# Patient Record
Sex: FEMALE | Race: WHITE | HISPANIC OR LATINO | URBAN - METROPOLITAN AREA
[De-identification: names, ages, dates, MRNs, and addresses within clinical notes are randomized per-mention and may not be internally consistent; named-entity substitution may affect disease eponyms.]

---

## 2020-11-15 ENCOUNTER — EMERGENCY (EMERGENCY)
Facility: HOSPITAL | Age: 75
LOS: 0 days | Discharge: HOME | End: 2020-11-15
Attending: EMERGENCY MEDICINE
Payer: MEDICARE

## 2020-11-15 VITALS — OXYGEN SATURATION: 97 % | WEIGHT: 145.06 LBS | RESPIRATION RATE: 18 BRPM | HEART RATE: 88 BPM | TEMPERATURE: 98 F

## 2020-11-15 VITALS
OXYGEN SATURATION: 98 % | DIASTOLIC BLOOD PRESSURE: 77 MMHG | HEART RATE: 77 BPM | SYSTOLIC BLOOD PRESSURE: 130 MMHG | RESPIRATION RATE: 16 BRPM | TEMPERATURE: 98 F

## 2020-11-15 PROCEDURE — 72125 CT NECK SPINE W/O DYE: CPT | Mod: 26

## 2020-11-15 PROCEDURE — 76376 3D RENDER W/INTRP POSTPROCES: CPT

## 2020-11-15 PROCEDURE — 12013 RPR F/E/E/N/L/M 2.6-5.0 CM: CPT

## 2020-11-15 PROCEDURE — 72125 CT NECK SPINE W/O DYE: CPT

## 2020-11-15 PROCEDURE — 70486 CT MAXILLOFACIAL W/O DYE: CPT

## 2020-11-15 PROCEDURE — 70450 CT HEAD/BRAIN W/O DYE: CPT | Mod: 26

## 2020-11-15 PROCEDURE — 99284 EMERGENCY DEPT VISIT MOD MDM: CPT | Mod: 25

## 2020-11-15 PROCEDURE — 70450 CT HEAD/BRAIN W/O DYE: CPT

## 2020-11-15 PROCEDURE — 76376 3D RENDER W/INTRP POSTPROCES: CPT | Mod: 26

## 2020-11-15 PROCEDURE — 70486 CT MAXILLOFACIAL W/O DYE: CPT | Mod: 26

## 2020-11-15 PROCEDURE — 99283 EMERGENCY DEPT VISIT LOW MDM: CPT

## 2020-11-15 NOTE — ED STATDOCS - PHYSICAL EXAMINATION
Constitutional: NAD AAOx3  Eyes: PERRLA EOMI  Head: +ecchymosis to L maxilla with TTP, no sign of entrapment.   ENT: No lopez sign, no raccoon eyes, no hemotympanum, no csf rhinorrhea, no nasal septal hematoma  Mouth: MMM  Cardiac: regular rate   Resp: Lungs CTAB  GI: Abd s/nt/nd  Neuro: CN2-12 intact  Skin: +2 cm linear laceration to L eyebrow.  Msk: No midline spinal ttp, full ROM of neck, c-collar cleared clinically and with provocative testing, no ttp of facial bones, no ttp to chest wall, pelvis stable, full ROM of all extremities without any ttp of extremities Constitutional: NAD AAOx3  Eyes: PERRLA EOMI  Head: +ecchymosis to L maxilla with TTP, no sign of entrapment.   ENT: No lopez sign, no raccoon eyes  Mouth: MMM  Cardiac: regular rate   Resp: Lungs CTAB  GI: Abd s/nt/nd  Neuro: CN2-12 intact  Skin: +3 cm linear laceration to L eyebrow.  Msk: No midline spinal ttp, full ROM of neck, c-collar cleared clinically and with provocative testing, no ttp of facial bones, no ttp to chest wall, pelvis stable, full ROM of all extremities without any ttp of extremities

## 2020-11-15 NOTE — ED ADULT TRIAGE NOTE - CHIEF COMPLAINT QUOTE
PT FELL AT [facility]. NO LOC, NO ANTICOAGULANTS. LACERATION ABOVE LEFT EYE. DENIES ANY VISUAL COMPLAINTS. DENIES ANY OTHER PAIN OR COMPLAINTS.

## 2020-11-15 NOTE — ED STATDOCS - CLINICAL SUMMARY MEDICAL DECISION MAKING FREE TEXT BOX
74 y/o F with PMHx of MS, ataxic gait at baseline presents to the ED from [facility] s/p fall hitting head, no LOC or AC. Pt was ambulatory after fall. Now with laceration to L eyebrow. Exam with. Will obtain CT to r/o bleed/fracture, laceration repair, and d/c 76 y/o F with PMHx of MS, ataxic gait at baseline presents to the ED from [facility] s/p fall hitting head, no LOC or AC. Pt was ambulatory after fall. Now with laceration to L eyebrow. Exam with 2 cm linear laceration to L eyebrow and ecchymosis to L maxilla with TTP. Will obtain CT to r/o bleed/fracture, laceration repair, and d/c 76 y/o F with PMHx of MS, ataxic gait at baseline presents to the ED from [facility] s/p fall hitting head, no LOC or AC. Pt was ambulatory after fall. Now with laceration to L eyebrow. Exam with 3 cm linear laceration to L eyebrow and ecchymosis to L maxilla with TTP. Will obtain CT to r/o bleed/fracture, laceration repair, and d/c

## 2020-11-15 NOTE — ED STATDOCS - NS_ ATTENDINGSCRIBEDETAILS _ED_A_ED_FT
I, Ricardo Mckeon MD,  performed the initial face to face bedside interview with this patient regarding history of present illness, review of symptoms and relevant past medical, social and family history.  I completed an independent physical examination.  I was the initial provider who evaluated this patient.  The history, relevant review of systems, past medical and surgical history, medical decision making, and physical examination was documented by the scribe in my presence and I attest to the accuracy of the documentation.

## 2020-11-15 NOTE — ED STATDOCS - OBJECTIVE STATEMENT
76 y/o F with PMHx of MS with ataxic gait at baseline presents to the ED BIBEMS from [facility] s/p trip and fall hitting her head. Notes +laceration and +ecchymosis to L eyebrow. No fever, LOC, vomiting, changes in vision, abd pain, or extremity pain. Never smoker. No ETOH use. Tetanus UTD.

## 2020-11-15 NOTE — ED ADULT NURSE NOTE - NSIMPLEMENTINTERV_GEN_ALL_ED
Implemented All Fall Risk Interventions:  [place] to call system. Call bell, personal items and telephone within reach. Instruct patient to call for assistance. Room bathroom lighting operational. Non-slip footwear when patient is off stretcher. Physically safe environment: no spills, clutter or unnecessary equipment. Stretcher in lowest position, wheels locked, appropriate side rails in place. Provide visual cue, wrist band, yellow gown, etc. Monitor gait and stability. Monitor for mental status changes and reorient to person, place, and time. Review medications for side effects contributing to fall risk. Reinforce activity limits and safety measures with patient and family.

## 2020-11-15 NOTE — ED STATDOCS - CARE PLAN
Principal Discharge DX:	Laceration of left eyebrow, initial encounter  Secondary Diagnosis:	CHI (closed head injury), initial encounter

## 2020-11-15 NOTE — ED ADULT NURSE NOTE - OBJECTIVE STATEMENT
Pt is a 75y female, awake, alert s/p fall today while ambulating, pt states her legs weakened and "gave out". Pt denies LOC, as per MD Mckeon pt is a Neuro Alert but is OK for ST. Pt placed in front of nurse station. Bed rails up, call bell within reach. Will continue to monitor.

## 2020-11-15 NOTE — ED STATDOCS - NS ED ROS FT
Constitutional: No fever or chills  Eyes: No visual changes  HEENT: No throat pain  CV: No chest pain  Resp: No SOB no cough  GI: No abd pain, nausea or vomiting  : No dysuria  MSK: No musculoskeletal pain  Skin: +laceration L eyebrow with +ecchymosis   Neuro: No headache

## 2020-11-15 NOTE — ED ADULT NURSE REASSESSMENT NOTE - NS ED NURSE REASSESS COMMENT FT1
MD Mckeon spoke with MD Ibrahim who spoke with Bakari. Pt does not need to be COVID swabbed prior to returning to facility.

## 2020-11-15 NOTE — ED STATDOCS - PATIENT PORTAL LINK FT
You can access the FollowMyHealth Patient Portal offered by [facility] by registering at the following website: [email or web address]. By joining [employer]’s FollowMyHealth portal, you will also be able to view your health information using other applications (apps) compatible with our system.

## 2020-11-15 NOTE — ED STATDOCS - NSFOLLOWUPINSTRUCTIONS_ED_ALL_ED_FT
1. return for worsening symptoms or anything concerning to you  2. take all home meds as prescribed  3. follow up with your pmd call to make an appointment    Laceration    WHAT YOU NEED TO KNOW:    A laceration is an injury to the skin and the soft tissue underneath it. Lacerations happen when you are cut or hit by something. They can happen anywhere on the body.     DISCHARGE INSTRUCTIONS:    Return to the emergency department if:     You have heavy bleeding or bleeding that does not stop after 10 minutes of holding firm, direct pressure over the wound.       Your wound opens up.     Contact your healthcare provider if:     You have a fever or chills.       Your laceration is red, warm, or swollen.      You have red streaks on your skin coming from your wound.      You have white or yellow drainage from the wound that smells bad.      You have pain that gets worse, even after treatment.       You have questions or concerns about your condition or care.     Medicines:     Prescription pain medicine may be given. Ask how to take this medicine safely.       Antibiotics help treat or prevent a bacterial infection.       Take your medicine as directed. Contact your healthcare provider if you think your medicine is not helping or if you have side effects. Tell him or her if you are allergic to any medicine. Keep a list of the medicines, vitamins, and herbs you take. Include the amounts, and when and why you take them. Bring the list or the pill bottles to follow-up visits. Carry your medicine list with you in case of an emergency.    Care for your wound as directed:     Do not get your wound wet until your healthcare provider says it is okay. Do not soak your wound in water. Do not go swimming until your healthcare provider says it is okay. Carefully wash the wound with soap and water. Gently pat the area dry or allow it to air dry.       Change your bandages when they get wet, dirty, or after washing. Apply new, clean bandages as directed. Do not apply elastic bandages or tape too tight. Do not put powders or lotions over your incision.       Apply antibiotic ointment as directed. Your healthcare provider may give you antibiotic ointment to put over your wound if you have stitches. If you have strips of tape over your incision, let them dry up and fall off on their own. If they do not fall off within 14 days, gently remove them. If you have glue over your wound, do not remove or pick at it. If your glue comes off, do not replace it with glue that you have at home.       Check your wound every day for signs of infection such as swelling, redness, or pus.     Self-care:     Apply ice on your wound for 15 to 20 minutes every hour or as directed. Use an ice pack, or put crushed ice in a plastic bag. Cover it with a towel. Ice helps prevent tissue damage and decreases swelling and pain.      Use a splint as directed. A splint will decrease movement and stress on your wound. It may help it heal faster. A splint may be used for lacerations over joints or areas of your body that bend. Ask your healthcare provider how to apply and remove a splint.       Decrease scarring of your wound by applying ointments as directed. Do not apply ointments until your healthcare provider says it is okay. You may need to wait until your wound is healed. Ask which ointment to buy and how often to use it. After your wound is healed, use sunscreen over the area when you are out in the sun. You should do this for at least 6 months to 1 year after your injury.     Follow up with your healthcare provider as directed: You may need to follow up in 24 to 48 hours to have your wound checked for infection. You will need to return in 3 to 14 days if you have stitches or staples so they can be removed. Care for your wound as directed to prevent infection and help it heal. Write down your questions so you remember to ask them during your visits.    Head Injury    WHAT YOU NEED TO KNOW:    A head injury is most often caused by a blow to the head. This may occur from a fall, bicycle injury, sports injury, being struck in the head, or a motor vehicle accident.     DISCHARGE INSTRUCTIONS:    Call 911 or have someone else call for any of the following:     You cannot be woken.      You have a seizure.      You stop responding to others or you faint.      You have blurry or double vision.      Your speech becomes slurred or confused.      You have arm or leg weakness, loss of feeling, or new problems with coordination.      Your pupils are larger than usual or one pupil is a different size than the other.       You have blood or clear fluid coming out of your ears or nose.    Return to the emergency department if:     You have repeated or forceful vomiting.      You feel confused.      Your headache gets worse or becomes severe.      You or someone caring for you notices that you are harder to wake than usual.    Contact your healthcare provider if:     Your symptoms last longer than 6 weeks after the injury.      You have questions or concerns about your condition or care.    Medicines:     Acetaminophen decreases pain. Acetaminophen is available without a doctor's order. Ask how much to take and how often to take it. Follow directions. Acetaminophen can cause liver damage if not taken correctly.      Take your medicine as directed. Contact your healthcare provider if you think your medicine is not helping or if you have side effects. Tell him or her if you are allergic to any medicine. Keep a list of the medicines, vitamins, and herbs you take. Include the amounts, and when and why you take them. Bring the list or the pill bottles to follow-up visits. Carry your medicine list with you in case of an emergency.    Self-care:     Rest or do quiet activities for 24 to 48 hours. Limit your time watching TV, using the computer, or doing tasks that require a lot of thinking. Slowly return to your normal activities as directed. Do not play sports or do activities that may cause you to get hit in the head. Ask your healthcare provider when you can return to sports.       Apply ice on your head for 15 to 20 minutes every hour or as directed. Use an ice pack, or put crushed ice in a plastic bag. Cover it with a towel before you apply it to your skin. Ice helps prevent tissue damage and decreases swelling and pain.       Have someone stay with you for 24 hours or as directed. This person can monitor you for complications and call 911. When you are awake the person should ask you a few questions to see if you are thinking clearly. An example would be to ask your name or your address.     Prevent another head injury:     Wear a helmet that fits properly. Do this when you play sports, or ride a bike, scooter, or skateboard. Helmets help decrease your risk of a serious head injury. Talk to your healthcare provider about other ways you can protect yourself if you play sports.      Wear your seat belt every time you are in a car. This helps to decrease your risk for a head injury if you are in a car accident.     Follow up with your healthcare provider as directed: Write down your questions so you remember to ask them during your visits.

## 2020-12-02 ENCOUNTER — EMERGENCY (EMERGENCY)
Facility: HOSPITAL | Age: 75
LOS: 0 days | Discharge: HOME | End: 2020-12-02
Attending: FAMILY MEDICINE
Payer: MEDICARE

## 2020-12-02 VITALS
TEMPERATURE: 98 F | DIASTOLIC BLOOD PRESSURE: 94 MMHG | RESPIRATION RATE: 20 BRPM | OXYGEN SATURATION: 98 % | SYSTOLIC BLOOD PRESSURE: 150 MMHG | HEART RATE: 80 BPM

## 2020-12-02 VITALS
TEMPERATURE: 99 F | DIASTOLIC BLOOD PRESSURE: 93 MMHG | OXYGEN SATURATION: 95 % | HEART RATE: 95 BPM | WEIGHT: 134.92 LBS | SYSTOLIC BLOOD PRESSURE: 161 MMHG | RESPIRATION RATE: 18 BRPM
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
## 2020-12-02 DIAGNOSIS — S09.90XA UNSPECIFIED INJURY OF HEAD, INITIAL ENCOUNTER: ICD-10-CM
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
## 2020-12-02 DIAGNOSIS — S01.01XA LACERATION WITHOUT FOREIGN BODY OF SCALP, INITIAL ENCOUNTER: ICD-10-CM

## 2020-12-02 DIAGNOSIS — S01.81XA LACERATION WITHOUT FOREIGN BODY OF OTHER PART OF HEAD, INITIAL ENCOUNTER: ICD-10-CM

## 2020-12-02 DIAGNOSIS — G35 MULTIPLE SCLEROSIS: ICD-10-CM

## 2020-12-02 PROCEDURE — 12001 RPR S/N/AX/GEN/TRNK 2.5CM/<: CPT

## 2020-12-02 PROCEDURE — 70450 CT HEAD/BRAIN W/O DYE: CPT | Mod: 26

## 2020-12-02 PROCEDURE — 99284 EMERGENCY DEPT VISIT MOD MDM: CPT | Mod: 25

## 2020-12-02 PROCEDURE — 99283 EMERGENCY DEPT VISIT LOW MDM: CPT

## 2020-12-02 PROCEDURE — 70450 CT HEAD/BRAIN W/O DYE: CPT

## 2020-12-02 PROCEDURE — 72125 CT NECK SPINE W/O DYE: CPT

## 2020-12-02 PROCEDURE — 72125 CT NECK SPINE W/O DYE: CPT | Mod: 26

## 2020-12-02 NOTE — ED PROVIDER NOTE - CARE PLAN
Principal Discharge DX:	Scalp laceration, initial encounter  Secondary Diagnosis:	Injury of head, initial encounter

## 2020-12-02 NOTE — ED ADULT NURSE NOTE - OBJECTIVE STATEMENT
Pt BIBA from [facility] s/p injury to head. Pt states she was walking when she "bumped head onto something" pt does not know what. Blood noted to right top of head, bleeding controlled. Pt denies pain, no headache, no blurred vision. Pt denies blood thinners, no LOC. As per pt, she did not want to come to ER, [facility] called EMS. Pt AAOx4. No complaints at this time.

## 2020-12-02 NOTE — ED ADULT TRIAGE NOTE - CHIEF COMPLAINT QUOTE
pt bibems from [facility] for head injury.  denies loc, not on any blood thinners, hx of multiple falls, ms pt bibems from [facility] for head injury.  denies loc, not on any blood thinners, hx of multiple falls, ms.  md sykes called neuro alert.

## 2020-12-02 NOTE — ED PROVIDER NOTE - OBJECTIVE STATEMENT
76 y/o female with PMHx of MS presents to the ED BIBEMS from [facility] s/p fall, with + head trauma with laceration to left side of head, at 5PM. Denies LOC, pt is not on any blood thinners. Neuro alert initiated. Pt is not complaining of any pain at this time. Normally self-ambulates. NKDA. Tetanus UTD.

## 2020-12-02 NOTE — ED PROVIDER NOTE - NSFOLLOWUPINSTRUCTIONS_ED_ALL_ED_FT
Keep wound clean and dry for 24 hours. After this, may wash with soap and water, rinse thoroughly          and  dry thoroughly with clean towel and apply bacitracin. Have staples removed in 7 to 10 days by your doctor. Return to ER if worse.

## 2020-12-02 NOTE — ED PROVIDER NOTE - SKIN, MLM
Skin normal color for race, warm, dry and intact. No evidence of rash. Skin normal color for race, warm, dry and intact. No evidence of rash. +2cm laceration to right temporal/occipital region

## 2020-12-02 NOTE — ED PROVIDER NOTE - PATIENT PORTAL LINK FT
You can access the FollowMyHealth Patient Portal offered by [facility] by registering at the following website: [email or web address]. By joining [employer]’s FollowMyHealth portal, you will also be able to view your health information using other applications (apps) compatible with our system.

## 2020-12-02 NOTE — ED ADULT NURSE NOTE - CHPI ED NUR SYMPTOMS NEG
no vomiting/no nausea/no seizure/no weakness/no blurred vision/no confusion/no change in level of consciousness/no loss of consciousness/no syncope/no dizziness

## 2020-12-02 NOTE — ED ADULT NURSE NOTE - NSIMPLEMENTINTERV_GEN_ALL_ED
Implemented All Fall Risk Interventions:  [place] to call system. Call bell, personal items and telephone within reach. Instruct patient to call for assistance. Room bathroom lighting operational. Non-slip footwear when patient is off stretcher. Physically safe environment: no spills, clutter or unnecessary equipment. Stretcher in lowest position, wheels locked, appropriate side rails in place. Provide visual cue, wrist band, yellow gown, etc. Monitor gait and stability. Monitor for mental status changes and reorient to person, place, and time. Review medications for side effects contributing to fall risk. Reinforce activity limits and safety measures with patient and family.

## 2020-12-02 NOTE — ED ADULT NURSE NOTE - CHIEF COMPLAINT QUOTE
pt bibems from [facility] for head injury.  denies loc, not on any blood thinners, hx of multiple falls, ms.  md sykes called neuro alert.

## 2020-12-02 NOTE — ED PROVIDER NOTE - CLINICAL SUMMARY MEDICAL DECISION MAKING FREE TEXT BOX
Pt with PMHx of MS, presents to the ED s/p fall with head trauma. CT head, C-spine, laceration repair.

## 2020-12-03 PROBLEM — G35 MULTIPLE SCLEROSIS: Chronic | Status: ACTIVE | Noted: 2020-11-15

## 2020-12-16 ENCOUNTER — EMERGENCY (EMERGENCY)
Facility: HOSPITAL | Age: 75
LOS: 0 days | Discharge: HOME | End: 2020-12-17
Attending: EMERGENCY MEDICINE
Payer: MEDICARE

## 2020-12-16 VITALS
WEIGHT: 125 LBS | RESPIRATION RATE: 18 BRPM | OXYGEN SATURATION: 97 % | HEIGHT: 65 IN | HEART RATE: 72 BPM | DIASTOLIC BLOOD PRESSURE: 93 MMHG | TEMPERATURE: 98 F | SYSTOLIC BLOOD PRESSURE: 133 MMHG

## 2020-12-16 VITALS
DIASTOLIC BLOOD PRESSURE: 89 MMHG | OXYGEN SATURATION: 99 % | RESPIRATION RATE: 18 BRPM | SYSTOLIC BLOOD PRESSURE: 156 MMHG | TEMPERATURE: 98 F | HEART RATE: 75 BPM

## 2020-12-16 DIAGNOSIS — Z99.89 DEPENDENCE ON OTHER ENABLING MACHINES AND DEVICES: ICD-10-CM

## 2020-12-16 DIAGNOSIS — M54.2 CERVICALGIA: ICD-10-CM

## 2020-12-16 DIAGNOSIS — S01.01XA LACERATION WITHOUT FOREIGN BODY OF SCALP, INITIAL ENCOUNTER: ICD-10-CM
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
## 2020-12-16 DIAGNOSIS — G35 MULTIPLE SCLEROSIS: ICD-10-CM

## 2020-12-16 PROCEDURE — 72125 CT NECK SPINE W/O DYE: CPT | Mod: 26

## 2020-12-16 PROCEDURE — 72170 X-RAY EXAM OF PELVIS: CPT | Mod: 26

## 2020-12-16 PROCEDURE — 72170 X-RAY EXAM OF PELVIS: CPT

## 2020-12-16 PROCEDURE — 70450 CT HEAD/BRAIN W/O DYE: CPT

## 2020-12-16 PROCEDURE — 72125 CT NECK SPINE W/O DYE: CPT

## 2020-12-16 PROCEDURE — 99284 EMERGENCY DEPT VISIT MOD MDM: CPT | Mod: 25

## 2020-12-16 PROCEDURE — 12002 RPR S/N/AX/GEN/TRNK2.6-7.5CM: CPT

## 2020-12-16 PROCEDURE — 99284 EMERGENCY DEPT VISIT MOD MDM: CPT

## 2020-12-16 PROCEDURE — 70450 CT HEAD/BRAIN W/O DYE: CPT | Mod: 26

## 2020-12-16 NOTE — ED ADULT NURSE NOTE - OBJECTIVE STATEMENT
patient brought in by EMS from [facility] s/p mechanical fall c/o head injury. patient was walking, tripped and fell striking back of head, laceration to posterior/ occipital area noted. Bleeding controlled. patient denies LOC. (-) blood thinners. patient denies neck patient is A&Ox4.

## 2020-12-16 NOTE — ED PROVIDER NOTE - CARE PLAN
Principal Discharge DX:	Closed head injury without loss of consciousness, subsequent encounter  Secondary Diagnosis:	Scalp laceration, subsequent encounter

## 2020-12-16 NOTE — ED PROVIDER NOTE - MUSCULOSKELETAL, MLM
Spine appears normal, range of motion is not limited, no muscle or joint tenderness Neck non tender AFROM without pain. Pelvis nontender stable. Back nontender positive scoliosis . Rib cage on tender and stable. HAWKINS x 4. No focal deformity, and swelling. B/l SLR 40 degrees without pain

## 2020-12-16 NOTE — ED PROCEDURE NOTE - CPROC ED TIME OUT STATEMENT1
“Patient's name, , procedure and correct site were confirmed during the [place] Timeout.”
“Patient's name, , procedure and correct site were confirmed during the [place] Timeout.”

## 2020-12-16 NOTE — ED ADULT NURSE NOTE - CHIEF COMPLAINT QUOTE
patient brought in by EMS from [facility] s/p mechanical fall c/o head injury.  patient was walking, tripped and fell striking back of head, bleeding controlled.  patient denies LOC, - blood thinners.  patient denies neck pain, headache.  patient is A&Ox4.

## 2020-12-16 NOTE — ED PROVIDER NOTE - CLINICAL SUMMARY MEDICAL DECISION MAKING FREE TEXT BOX
76 y/o female +MS baseline with walker with hx of occasional fall BIBA from home s/p trip and fall with head injury with bleeding. No LOC, headache, or neuro complaints. Plan: CT head, c spine, pelvis x-ray, removal of 2 prior staples, and laceration repair with staples.

## 2020-12-16 NOTE — ED PROVIDER NOTE - CONSTITUTIONAL, MLM
normal... Well appearing, awake, alert, oriented to person, place, time/situation and in no apparent distress. Elderly female awake and alert in no respiratory distress. Not acutely ill.

## 2020-12-16 NOTE — ED ADULT TRIAGE NOTE - CHIEF COMPLAINT QUOTE
patient brought in by EMS from [facility] s/p mechanical fall c/o head injury.  patient was walking, tripped and fell striking back of head, bleeding controlled.  patient denies LOC, - blood thinners.  patient denies neck pain, headache.  patient is A&Ox4.

## 2020-12-16 NOTE — ED PROVIDER NOTE - PATIENT PORTAL LINK FT
You can access the FollowMyHealth Patient Portal offered by [facility] by registering at the following website: [email or web address]. By joining [employer]’s FollowMyHealth portal, you will also be able to view your health information using other applications (apps) compatible with our system.

## 2020-12-16 NOTE — ED PROVIDER NOTE - NEUROLOGICAL, MLM
Alert and oriented, no focal deficits, no motor or sensory deficits. Alert and oriented x3 , no focal deficits, no motor or sensory deficits. Normal speech.

## 2020-12-16 NOTE — ED PROVIDER NOTE - ENMT, MLM
Oropharynx clear, mucous membranes mildly dry. Oropharynx clear, mucous membranes mildly dry. battles negative. No clinical evidence of facial injury. 2 staples one partially dialoged on scalp. Abrasion to posterior right parietal scalp with mild hematoma but no active bleeding. Dry blood on left occipital scalp laceration.

## 2020-12-16 NOTE — ED PROVIDER NOTE - NSFOLLOWUPINSTRUCTIONS_ED_ALL_ED_FT
Continue your regular medications as per routine.  Tylenol 325 mg 2 tabs every 6 hours as needed for headache, aches & pains.  Staples removal in 1 week: this ED, [facility], or by your son.      ED evaluation and management discussed with the patient and family (if available) in detail.  Close PMD follow up encouraged.  Strict ED return instructions discussed in detail and patient given the opportunity to ask any questions about their discharge diagnosis and instructions. Patient verbalized understanding.        Closed Head Injury    A closed head injury is an injury to your head that may or may not involve a traumatic brain injury (TBI). Symptoms of TBI can be short or long lasting and include headache, dizziness, interference with memory or speech, fatigue, confusion, changes in sleep, mood changes, nausea, depression/anxiety, and dulling of senses. Make sure to obtain proper rest which includes getting plenty of sleep, avoiding excessive visual stimulation, and avoiding activities that may cause physical or mental stress. Avoid any situation where there is potential for another head injury, including sports.    SEEK IMMEDIATE MEDICAL CARE IF YOU HAVE ANY OF THE FOLLOWING SYMPTOMS: unusual drowsiness, vomiting, severe dizziness, seizures, lightheadedness, muscular weakness, different pupil sizes, visual changes, or clear or bloody discharge from your ears or nose.        Fall Prevention in the Home, Adult  Falls can cause injuries. They can happen to people of all ages. There are many things you can do to make your home safe and to help prevent falls. Ask for help when making these changes, if needed.  What actions can I take to prevent falls?  General Instructions     Use good lighting in all rooms. Replace any light bulbs that burn out.Turn on the lights when you go into a dark area. Use night-lights.Keep items that you use often in easy-to-reach places. Lower the shelves around your home if necessary.Set up your furniture so you have a clear path. Avoid moving your furniture around.Do not have throw rugs and other things on the floor that can make you trip.Avoid walking on wet floors.If any of your floors are uneven, fix them.Add color or contrast paint or tape to clearly angel and help you see:  Any grab bars or handrails.First and last steps of stairways.Where the edge of each step is.If you use a stepladder:  Make sure that it is fully opened. Do not climb a closed stepladder.Make sure that both sides of the stepladder are locked into place.Ask someone to hold the stepladder for you while you use it.If there are any pets around you, be aware of where they are.What can I do in the bathroom?         Keep the floor dry. Clean up any water that spills onto the floor as soon as it happens.Remove soap buildup in the tub or shower regularly.Use non-skid mats or decals on the floor of the tub or shower.Attach bath mats securely with double-sided, non-slip rug tape.If you need to sit down in the shower, use a plastic, non-slip stool.Install grab bars by the toilet and in the tub and shower. Do not use towel bars as grab bars.What can I do in the bedroom?     Make sure that you have a light by your bed that is easy to reach.Do not use any sheets or blankets that are too big for your bed. They should not hang down onto the floor.Have a firm chair that has side arms. You can use this for support while you get dressed.What can I do in the kitchen?     Clean up any spills right away.If you need to reach something above you, use a strong step stool that has a grab bar.Keep electrical cords out of the way.Do not use floor polish or wax that makes floors slippery. If you must use wax, use non-skid floor wax.What can I do with my stairs?     Do not leave any items on the stairs.Make sure that you have a light switch at the top of the stairs and the bottom of the stairs. If you do not have them, ask someone to add them for you.Make sure that there are handrails on both sides of the stairs, and use them. Fix handrails that are broken or loose. Make sure that handrails are as long as the stairways.Install non-slip stair treads on all stairs in your home.Avoid having throw rugs at the top or bottom of the stairs. If you do have throw rugs, attach them to the floor with carpet tape.Choose a carpet that does not hide the edge of the steps on the stairway.Check any carpeting to make sure that it is firmly attached to the stairs. Fix any carpet that is loose or worn.What can I do on the outside of my home?     Use bright outdoor lighting.Regularly fix the edges of walkways and driveways and fix any cracks.Remove anything that might make you trip as you walk through a door, such as a raised step or threshold.Trim any bushes or trees on the path to your home.Regularly check to see if handrails are loose or broken. Make sure that both sides of any steps have handrails.Install guardrails along the edges of any raised decks and porches.Clear walking paths of anything that might make someone trip, such as tools or rocks.Have any leaves, snow, or ice cleared regularly.Use sand or salt on walking paths during winter.Clean up any spills in your garage right away. This includes grease or oil spills.What other actions can I take?     Wear shoes that:  Have a low heel. Do not wear high heels.Have rubber bottoms.Are comfortable and fit you well.Are closed at the toe. Do not wear open-toe sandals.Use tools that help you move around (mobility aids) if they are needed. These include:  Canes.Walkers.Scooters.Crutches.Review your medicines with your doctor. Some medicines can make you feel dizzy. This can increase your chance of falling.Ask your doctor what other things you can do to help prevent falls.  Where to find more information  Centers for Disease Control and PreventionKENZIE: https://cdc.govNational [place] on Aging: https://wk3lzia.christine.nih.govContact a doctor if:  You are afraid of falling at home.You feel weak, drowsy, or dizzy at home.You fall at home.Summary  There are many simple things that you can do to make your home safe and to help prevent falls.Ways to make your home safe include removing tripping hazards and installing grab bars in the bathroom.Ask for help when making these changes in your home.This information is not intended to replace advice given to you by your health care provider. Make sure you discuss any questions you have with your health care provider.            Sutures, Staples, or Adhesive Wound Closure      Wound closure refers to holding skin and underlying tissue together while it heals, such as after surgery or after an injury. Health care providers use stitches (sutures), staples, and special types of glue (skin adhesives) to close wounds. Your health care provider will use a wound closure method that helps you heal quickly and reduces the chances of infection or scarring. The type of wound closure depends on the location, size, and depth of your wound.    In most cases, wounds are closed as soon as possible (primary skin closure). Sometimes, closure is delayed so the wound can be cleaned and then can heal naturally over weeks or months (delayed wound closure). This reduces the chance of infection.      What are the different types of wound closure?    Adhesive glue     To use adhesive glue, your health care provider holds the edges of the wound together and paints the glue on the surface of your skin. You may need more than one layer of glue. Once the glue is dry, the wound may be covered with a dressing.    This type of skin closure may be used for small wounds that are not deep (superficial wounds). It is often used for children and on facial wounds. Adhesive glue is less painful than other methods of wound closure, and it does not require a medicine to numb the area (local anesthetic). This method also leaves nothing to be removed.    Adhesive glue cannot be used for wounds that are deep, uneven, or bleeding. It is not used inside of a wound.    Adhesive strips     These strips are made of paper that is sticky (adhesive) and has many small holes in it (is porous). They are applied across your wound edges like a regular bandage.    Adhesive strips may be used to close very shallow wounds. They may be used along with sutures to improve skin closure.    Sutures     Sutures come in many different materials, strengths, and sizes. They may break down as your wound heals (absorbable), or they may need to be removed (nonabsorbable).    Your health care provider sews your skin or the tissues under your skin together with sutures and a steel needle. Your skin edges may be closed in one long (continuous) stitch or in separate stitches. Then the sutures are tied and cut.    Sutures can be used for all kinds of wounds. Absorbable sutures may be used to close tissues under the skin. Sutures can cause a skin reaction that can lead to infection.    Staples     When staples are used to close a wound, the edges of your skin on both sides of the wound are brought close together. A staple is placed across the wound, and an instrument secures the staple edges together.    Staples are often used to close surgical incisions. They are faster to use than sutures, and they cause less skin reaction. Staples need to be removed using a tool that bends the staples away from your skin.      Follow these instructions at home:    Medicines     •Take over-the-counter and prescription medicines only as told by your health care provider.      •If you were prescribed an antibiotic medicine, take it as told by your health care provider. Do not stop taking the antibiotic even if you start to feel better.        Wound care      •Follow instructions from your health care provider about how to take care of your wound and dressing.      •Wash your hands with soap and water before and after touching your wound or dressing. If soap and water are not available, use hand .    • Do not try to remove your wound closures unless your health care provider tells you to do that. You may need a follow-up visit with your health care provider to remove your closures.  •Wound closures may stay in place for 2 weeks or longer.      •Absorbable sutures may dissolve after a few days or weeks.      •If adhesive strip edges start to loosen and curl up, you may trim the loose edges.        • Do not pick at your wound. Picking can cause an infection.      •Apply ointments or creams only as told by your health care provider.    •Check your wound every day for signs of infection. Check for:  •Redness, swelling, or pain.      •Fluid or blood.      •Warmth.      •Pus or a bad smell.        General instructions     • Do not take baths, swim, or use a hot tub until your health care provider approves. Ask your health care provider if you may take showers. You may only be allowed to take sponge baths.      • Do not soak your wound in water.      •Keep all follow-up visits as told by your health care provider. This is important.        Contact a health care provider if you:    •Have a fever or chills.      •Have redness, swelling, or pain around your wound.      •Have fluid or blood coming from your wound.      •Notice that your wound feels warm to the touch.      •Notice pus or a bad smell coming from your wound.      •Notice that the edges of your wound start to separate after your sutures come out.      •Notice that your wound becomes thick, raised, and darker in color after your sutures come out (scarring).        Summary    •The type of wound closure that your health care provider will use depends on the location, size, and depth of your wound. Options to close wounds include stitches (sutures), staples, special types of glue (skin adhesives), and adhesive strips.      •Your health care provider will use a wound closure method that helps you heal quickly and reduces the chances of infection or scarring.      • Do not soak your wound in water. Do not take baths, shower, swim, or use a hot tub until your health care provider approves.      This information is not intended to replace advice given to you by your health care provider. Make sure you discuss any questions you have with your health care provider.      Document Revised: 11/30/2018 Document Reviewed: 10/25/2018    Elsevier Patient Education © 2020 [employer]

## 2020-12-16 NOTE — ED PROVIDER NOTE - PROGRESS NOTE DETAILS
Dr. Vázquez:  Scalp lac repair by staples done by PA appreciated: pt w/o any active c/o's nor pain, + stable for D/C home.  Voice mail left for DL Sanders to f/u tomorrow re: frequent falls past few weeks. Dr. Vázquez:  Informed by ED RN that pt + passed ambulation trial with her walker.  Pt stable for D/C at this time. Dr. Vázquez:  Scalp lac repair by staples done by PA appreciated: pt w/o any active c/o's nor pain, + stable for D/C home if passes ambulation trial with walker assistance.  Voice mail left for ED LUCIA Sanders to f/u tomorrow re: frequent falls past few weeks for possible home services or other assistance/intervention.

## 2020-12-16 NOTE — ED ADULT NURSE NOTE - NSIMPLEMENTINTERV_GEN_ALL_ED
Implemented All Fall with Harm Risk Interventions:  [place] to call system. Call bell, personal items and telephone within reach. Instruct patient to call for assistance. Room bathroom lighting operational. Non-slip footwear when patient is off stretcher. Physically safe environment: no spills, clutter or unnecessary equipment. Stretcher in lowest position, wheels locked, appropriate side rails in place. Provide visual cue, wrist band, yellow gown, etc. Monitor gait and stability. Monitor for mental status changes and reorient to person, place, and time. Review medications for side effects contributing to fall risk. Reinforce activity limits and safety measures with patient and family. Provide visual clues: red socks.

## 2020-12-16 NOTE — ED PROVIDER NOTE - SKIN, MLM
Skin normal color for race, warm, dry and intact. No evidence of rash. See ENMT. No external evidence of trauma

## 2020-12-16 NOTE — ED PROVIDER NOTE - OBJECTIVE STATEMENT
74 y/o female with PMHx of MS presents to the ED BIBEMS from [facility] s/p fall, with + head trauma. Pt was walking and tripped. Pt fell striking back of her head. Denies LOC. Pt self ambulate. Tetanus shot up to date. Pt not on any blood thinners. 76 y/o female with PMHx of MS presents to the ED BIBEMS from [facility] s/p fall, with + head trauma. Pt hit the right parietal side of head and reports + laceration which was +bleeding. Pt was walking to go to the bathroom and tripped over the ledge of bathroom. Pt fell striking back of her head. Denies LOC, headache, neck pain, back pain, weakness of extremities, loss of control of bowel. Pt uses walker or cane assistance. Tetanus shot up to date. Pt not on any blood thinners. No known COVID exposure. Pt moved to [place] from [place] 3 weeks ago. NKDA.

## 2020-12-17 PROBLEM — G35 MULTIPLE SCLEROSIS: Chronic | Status: ACTIVE | Noted: 2020-12-08

## 2022-02-20 NOTE — ED ADULT NURSE NOTE - PLAN OF CARE
Pt removing her pulse ox, reminded to keep it on the finger.      Lian Ferraro, JEFE  02/20/22 [id number] Side rails/Call bell/Fall precautions/Position of comfort

## 2022-07-26 NOTE — ED ADULT TRIAGE NOTE - DIRECT TO ROOM CARE INITIATED:
16-Dec-2020 20:23 [place] Pregnancy And Lactation Text: This medication is considered safe during pregnancy and breastfeeding.